# Patient Record
Sex: FEMALE | Race: OTHER | HISPANIC OR LATINO | URBAN - METROPOLITAN AREA
[De-identification: names, ages, dates, MRNs, and addresses within clinical notes are randomized per-mention and may not be internally consistent; named-entity substitution may affect disease eponyms.]

---

## 2021-10-23 ENCOUNTER — EMERGENCY (EMERGENCY)
Facility: HOSPITAL | Age: 36
LOS: 1 days | Discharge: HOME | End: 2021-10-23
Admitting: EMERGENCY MEDICINE
Payer: MEDICAID

## 2021-10-23 VITALS
RESPIRATION RATE: 18 BRPM | SYSTOLIC BLOOD PRESSURE: 107 MMHG | HEART RATE: 88 BPM | DIASTOLIC BLOOD PRESSURE: 71 MMHG | TEMPERATURE: 99 F | OXYGEN SATURATION: 97 %

## 2021-10-23 DIAGNOSIS — R11.0 NAUSEA: ICD-10-CM

## 2021-10-23 DIAGNOSIS — F41.0 PANIC DISORDER [EPISODIC PAROXYSMAL ANXIETY]: ICD-10-CM

## 2021-10-23 DIAGNOSIS — F41.9 ANXIETY DISORDER, UNSPECIFIED: ICD-10-CM

## 2021-10-23 PROCEDURE — 99284 EMERGENCY DEPT VISIT MOD MDM: CPT

## 2021-10-23 PROCEDURE — 99053 MED SERV 10PM-8AM 24 HR FAC: CPT

## 2021-10-23 RX ADMIN — Medication 1 MILLIGRAM(S): at 23:20

## 2021-10-23 NOTE — ED PROVIDER NOTE - CLINICAL SUMMARY MEDICAL DECISION MAKING FREE TEXT BOX
nausea secondary to anxiety attack, ativan 1mg PO in ED. Reassess nausea secondary to anxiety attack, ativan 1mg PO in ED. Reassess - pt asymptomatic, dc home - f/u psychiatry appointment and discussed with patient to reassess her job situation.

## 2021-10-23 NOTE — ED PROVIDER NOTE - PATIENT PORTAL LINK FT
You can access the FollowMyHealth Patient Portal offered by [facility] by registering at the following website: [email or web address]. By joining [facility]’s FollowMyHealth portal, you will also be able to view your health information using other applications (apps) compatible with our system.

## 2021-10-23 NOTE — ED PROVIDER NOTE - CONSTITUTIONAL, MLM
Well appearing, awake, alert, oriented to person, place, time/situation and in no apparent distress. Patient appears anxious, mild tremors. normal...

## 2021-10-23 NOTE — ED PROVIDER NOTE - NSFOLLOWUPINSTRUCTIONS_ED_ALL_ED_FT
1. Follow up with your Psychiatry appointment on 11/18/21.  2. Return to the ED if you experience worsening symptoms.  --  Anxiety    Generalized anxiety disorder (CHRISTIE) is a mental disorder. It is defined as anxiety that is not necessarily related to specific events or activities or is out of proportion to said events. Symptoms include restlessness, fatigue, difficulty concentrations, irritability and difficulty concentrating. It may interfere with life functions, including relationships, work, and school. If you were started on a medication, make sure to take exactly as prescribed and follow up with a psychiatrist.    SEEK IMMEDIATE MEDICAL CARE IF YOU HAVE ANY OF THE FOLLOWING SYMPTOMS: thoughts about hurting killing yourself, thoughts about hurting or killing somebody else, hallucinations, or worsening depression.
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
## 2025-05-23 NOTE — ED PROVIDER NOTE - PROGRESS NOTE
Copied from CRM #[id number]. Topic: MW Messaging - MW Patient Request  >> May 22, 2025  3:53 PM Brenna BLOOM wrote:  --DO NOT REPLY - Sent from PACT - If sent to wrong pool, reroute to P ECO Reroute pool --    General Message: Patient is returning a call to the team of Andres Huston MD. Please see previous encounter \"ECO if patient returns call, please route to doctor pool and we will return the patient call. Thanks\"  Please call the patient back to advise. Thank you.   Callback #: [phone]   Can a detailed message be left? Yes - Voicemail   Caller has been advised this message will be addressed within:2-3 business days [routine]        
Writer contacted patient and informed patient reason for office phone call was to schedule a blood pressure check. Patient has an upcoming appt scheduled with  in August. Writer gave pt new location. Patient verbalized understandings, no further assistance needed.   
Improved.